# Patient Record
Sex: MALE | Race: OTHER | NOT HISPANIC OR LATINO | Employment: OTHER | URBAN - METROPOLITAN AREA
[De-identification: names, ages, dates, MRNs, and addresses within clinical notes are randomized per-mention and may not be internally consistent; named-entity substitution may affect disease eponyms.]

---

## 2021-07-02 ENCOUNTER — HOSPITAL ENCOUNTER (INPATIENT)
Facility: HOSPITAL | Age: 81
LOS: 1 days | Discharge: HOME | DRG: 198 | End: 2021-07-04
Attending: EMERGENCY MEDICINE | Admitting: FAMILY MEDICINE
Payer: MEDICAID

## 2021-07-02 ENCOUNTER — APPOINTMENT (EMERGENCY)
Dept: RADIOLOGY | Facility: HOSPITAL | Age: 81
DRG: 198 | End: 2021-07-02
Payer: MEDICAID

## 2021-07-02 ENCOUNTER — APPOINTMENT (OUTPATIENT)
Dept: NON INVASIVE DIAGNOSTICS | Facility: HOSPITAL | Age: 81
DRG: 198 | End: 2021-07-02
Payer: MEDICAID

## 2021-07-02 DIAGNOSIS — R55 NEAR SYNCOPE: ICD-10-CM

## 2021-07-02 DIAGNOSIS — R06.02 SHORTNESS OF BREATH: ICD-10-CM

## 2021-07-02 DIAGNOSIS — R07.9 CHEST PAIN: Primary | ICD-10-CM

## 2021-07-02 DIAGNOSIS — I10 HYPERTENSION: ICD-10-CM

## 2021-07-02 PROBLEM — N40.0 BENIGN PROSTATIC HYPERPLASIA WITHOUT LOWER URINARY TRACT SYMPTOMS: Status: ACTIVE | Noted: 2017-06-28

## 2021-07-02 PROBLEM — E11.9 TYPE 2 DIABETES MELLITUS WITHOUT COMPLICATION (HCC): Status: ACTIVE | Noted: 2017-06-28

## 2021-07-02 PROBLEM — E78.2 MIXED HYPERLIPIDEMIA: Status: ACTIVE | Noted: 2018-07-03

## 2021-07-02 PROBLEM — Z95.5 S/P CORONARY ARTERY STENT PLACEMENT: Status: ACTIVE | Noted: 2017-06-28

## 2021-07-02 LAB
ALBUMIN SERPL BCP-MCNC: 3.4 G/DL (ref 3.5–5)
ALP SERPL-CCNC: 67 U/L (ref 46–116)
ALT SERPL W P-5'-P-CCNC: 22 U/L (ref 12–78)
ANION GAP SERPL CALCULATED.3IONS-SCNC: 7 MMOL/L (ref 4–13)
AST SERPL W P-5'-P-CCNC: 20 U/L (ref 5–45)
ATRIAL RATE: 60 BPM
BASOPHILS # BLD AUTO: 0.05 THOUSANDS/ΜL (ref 0–0.1)
BASOPHILS NFR BLD AUTO: 1 % (ref 0–1)
BILIRUB SERPL-MCNC: 0.4 MG/DL (ref 0.2–1)
BUN SERPL-MCNC: 13 MG/DL (ref 5–25)
CALCIUM ALBUM COR SERPL-MCNC: 9.2 MG/DL (ref 8.3–10.1)
CALCIUM SERPL-MCNC: 8.7 MG/DL (ref 8.3–10.1)
CHLORIDE SERPL-SCNC: 105 MMOL/L (ref 100–108)
CO2 SERPL-SCNC: 28 MMOL/L (ref 21–32)
CREAT SERPL-MCNC: 0.8 MG/DL (ref 0.6–1.3)
EOSINOPHIL # BLD AUTO: 0.12 THOUSAND/ΜL (ref 0–0.61)
EOSINOPHIL NFR BLD AUTO: 2 % (ref 0–6)
ERYTHROCYTE [DISTWIDTH] IN BLOOD BY AUTOMATED COUNT: 12.4 % (ref 11.6–15.1)
GFR SERPL CREATININE-BSD FRML MDRD: 84 ML/MIN/1.73SQ M
GLUCOSE SERPL-MCNC: 128 MG/DL (ref 65–140)
HCT VFR BLD AUTO: 42 % (ref 36.5–49.3)
HGB BLD-MCNC: 14.2 G/DL (ref 12–17)
IMM GRANULOCYTES # BLD AUTO: 0.03 THOUSAND/UL (ref 0–0.2)
IMM GRANULOCYTES NFR BLD AUTO: 0 % (ref 0–2)
LIPASE SERPL-CCNC: 199 U/L (ref 73–393)
LYMPHOCYTES # BLD AUTO: 1.31 THOUSANDS/ΜL (ref 0.6–4.47)
LYMPHOCYTES NFR BLD AUTO: 18 % (ref 14–44)
MCH RBC QN AUTO: 31.5 PG (ref 26.8–34.3)
MCHC RBC AUTO-ENTMCNC: 33.8 G/DL (ref 31.4–37.4)
MCV RBC AUTO: 93 FL (ref 82–98)
MONOCYTES # BLD AUTO: 0.5 THOUSAND/ΜL (ref 0.17–1.22)
MONOCYTES NFR BLD AUTO: 7 % (ref 4–12)
NEUTROPHILS # BLD AUTO: 5.32 THOUSANDS/ΜL (ref 1.85–7.62)
NEUTS SEG NFR BLD AUTO: 72 % (ref 43–75)
NRBC BLD AUTO-RTO: 0 /100 WBCS
P AXIS: 65 DEGREES
PLATELET # BLD AUTO: 167 THOUSANDS/UL (ref 149–390)
PMV BLD AUTO: 10.9 FL (ref 8.9–12.7)
POTASSIUM SERPL-SCNC: 4.2 MMOL/L (ref 3.5–5.3)
PR INTERVAL: 164 MS
PROT SERPL-MCNC: 6.5 G/DL (ref 6.4–8.2)
QRS AXIS: 0 DEGREES
QRSD INTERVAL: 104 MS
QT INTERVAL: 400 MS
QTC INTERVAL: 400 MS
RBC # BLD AUTO: 4.51 MILLION/UL (ref 3.88–5.62)
SODIUM SERPL-SCNC: 140 MMOL/L (ref 136–145)
T WAVE AXIS: 61 DEGREES
TROPONIN I SERPL-MCNC: <0.02 NG/ML
VENTRICULAR RATE: 60 BPM
WBC # BLD AUTO: 7.33 THOUSAND/UL (ref 4.31–10.16)

## 2021-07-02 PROCEDURE — 80053 COMPREHEN METABOLIC PANEL: CPT | Performed by: EMERGENCY MEDICINE

## 2021-07-02 PROCEDURE — 99285 EMERGENCY DEPT VISIT HI MDM: CPT

## 2021-07-02 PROCEDURE — 93306 TTE W/DOPPLER COMPLETE: CPT

## 2021-07-02 PROCEDURE — 99220 PR INITIAL OBSERVATION CARE/DAY 70 MINUTES: CPT | Performed by: INTERNAL MEDICINE

## 2021-07-02 PROCEDURE — 36415 COLL VENOUS BLD VENIPUNCTURE: CPT | Performed by: EMERGENCY MEDICINE

## 2021-07-02 PROCEDURE — 84484 ASSAY OF TROPONIN QUANT: CPT | Performed by: INTERNAL MEDICINE

## 2021-07-02 PROCEDURE — 85025 COMPLETE CBC W/AUTO DIFF WBC: CPT | Performed by: EMERGENCY MEDICINE

## 2021-07-02 PROCEDURE — 71045 X-RAY EXAM CHEST 1 VIEW: CPT

## 2021-07-02 PROCEDURE — 99285 EMERGENCY DEPT VISIT HI MDM: CPT | Performed by: EMERGENCY MEDICINE

## 2021-07-02 PROCEDURE — 93010 ELECTROCARDIOGRAM REPORT: CPT | Performed by: INTERNAL MEDICINE

## 2021-07-02 PROCEDURE — 83690 ASSAY OF LIPASE: CPT | Performed by: EMERGENCY MEDICINE

## 2021-07-02 PROCEDURE — 93306 TTE W/DOPPLER COMPLETE: CPT | Performed by: INTERNAL MEDICINE

## 2021-07-02 PROCEDURE — 84484 ASSAY OF TROPONIN QUANT: CPT | Performed by: EMERGENCY MEDICINE

## 2021-07-02 PROCEDURE — 93005 ELECTROCARDIOGRAM TRACING: CPT

## 2021-07-02 RX ORDER — LOSARTAN POTASSIUM 25 MG/1
25 TABLET ORAL DAILY
COMMUNITY

## 2021-07-02 RX ORDER — ASPIRIN 81 MG/1
81 TABLET, CHEWABLE ORAL DAILY
COMMUNITY

## 2021-07-02 RX ORDER — NITROGLYCERIN 0.4 MG/1
0.4 TABLET SUBLINGUAL
Status: DISCONTINUED | OUTPATIENT
Start: 2021-07-02 | End: 2021-07-04 | Stop reason: HOSPADM

## 2021-07-02 RX ORDER — MAGNESIUM 30 MG
30 TABLET ORAL 2 TIMES DAILY
COMMUNITY

## 2021-07-02 RX ORDER — ACETAMINOPHEN 325 MG/1
650 TABLET ORAL EVERY 6 HOURS PRN
Status: DISCONTINUED | OUTPATIENT
Start: 2021-07-02 | End: 2021-07-04 | Stop reason: HOSPADM

## 2021-07-02 RX ORDER — HYDRALAZINE HYDROCHLORIDE 20 MG/ML
5 INJECTION INTRAMUSCULAR; INTRAVENOUS EVERY 6 HOURS PRN
Status: DISCONTINUED | OUTPATIENT
Start: 2021-07-02 | End: 2021-07-04 | Stop reason: HOSPADM

## 2021-07-02 RX ORDER — HEPARIN SODIUM 5000 [USP'U]/ML
5000 INJECTION, SOLUTION INTRAVENOUS; SUBCUTANEOUS EVERY 8 HOURS SCHEDULED
Status: DISCONTINUED | OUTPATIENT
Start: 2021-07-02 | End: 2021-07-04 | Stop reason: HOSPADM

## 2021-07-02 RX ORDER — ONDANSETRON 2 MG/ML
4 INJECTION INTRAMUSCULAR; INTRAVENOUS EVERY 6 HOURS PRN
Status: DISCONTINUED | OUTPATIENT
Start: 2021-07-02 | End: 2021-07-04 | Stop reason: HOSPADM

## 2021-07-02 RX ORDER — ASPIRIN 81 MG/1
243 TABLET, CHEWABLE ORAL ONCE
Status: COMPLETED | OUTPATIENT
Start: 2021-07-02 | End: 2021-07-02

## 2021-07-02 RX ORDER — VIT B COMP NO.3/FOLIC/C/BIOTIN 1 MG-60 MG
1 TABLET ORAL
COMMUNITY

## 2021-07-02 RX ORDER — ATORVASTATIN CALCIUM 20 MG/1
20 TABLET, FILM COATED ORAL DAILY
COMMUNITY

## 2021-07-02 RX ADMIN — HEPARIN SODIUM 5000 UNITS: 5000 INJECTION INTRAVENOUS; SUBCUTANEOUS at 23:54

## 2021-07-02 RX ADMIN — ASPIRIN 81 MG CHEWABLE TABLET 243 MG: 81 TABLET CHEWABLE at 11:47

## 2021-07-02 RX ADMIN — HEPARIN SODIUM 5000 UNITS: 5000 INJECTION INTRAVENOUS; SUBCUTANEOUS at 16:05

## 2021-07-02 NOTE — H&P
Rashad Moulton 1940, [de-identified] y o  male MRN: [id number]  Unit/Bed#: ED 09 Encounter: [id number]  Primary Care Provider: No primary care provider on file  Date and time admitted to hospital: 7/2/2021 11:28 AM    Chest pain  Assessment & Plan  Presented with exertional chest pain  Has had intermittent episodes recently  Heart score 7  EKG showed no signs of acute ischemia  Initial troponin negative  Will trend troponins  Given typical symptoms will consult Cardiology for possible stress test versus procedure    Mixed hyperlipidemia  Assessment & Plan  Continue statin therapy    Type 2 diabetes mellitus without complication ([facility] [address] )  Assessment & Plan  No results found for: HGBA1C    No results for input(s): POCGLU in the last 72 hours  Blood Sugar Average: Last 72 hrs:   sliding scale insulin    S/P coronary artery stent placement  Assessment & Plan  Continue aspirin, statin, beta-blocker    Essential hypertension  Assessment & Plan  Continue home meds      VTE Prophylaxis: Heparin  / sequential compression device   Code Status: full code  POLST: There is no POLST form on file for this patient (pre-hospital)  Discussion with family: pt    Anticipated Length of Stay:  Patient will be admitted on an Observation basis with an anticipated length of stay of  < 2 midnights  Justification for Hospital Stay:  Chest pain    Total Time for Visit, including Counseling / Coordination of Care: 60 minutes  Greater than 50% of this total time spent on direct patient counseling and coordination of care  Chief Complaint:   Chest pain    History of Present Illness:    Reyes Schmidt is a [de-identified] y o  male past medical history significant type 2 diabetes, coronary artery disease status post PCI, hyperlipidemia, hypertension initially presented chest pain  Reports exertional chest pain worse with walking earlier today  Also notes shortness of breath    Notes intermittent symptoms the past few weeks with worsening acutely earlier today  Otherwise denies any acute complaints    Review of Systems:    Review of Systems   Constitutional: Negative for appetite change, chills, diaphoresis, fatigue, fever and unexpected weight change  HENT: Negative for congestion, rhinorrhea and sore throat  Eyes: Negative for photophobia and visual disturbance  Respiratory: Positive for shortness of breath  Negative for cough and wheezing  Cardiovascular: Positive for chest pain  Negative for palpitations and leg swelling  Gastrointestinal: Negative for abdominal pain, anal bleeding, blood in stool, constipation, diarrhea, nausea and vomiting  Genitourinary: Negative for decreased urine volume, difficulty urinating, dysuria, flank pain, frequency, hematuria and urgency  Musculoskeletal: Negative for arthralgias, back pain, joint swelling and myalgias  Skin: Negative for color change and rash  Neurological: Negative for dizziness, seizures, facial asymmetry, speech difficulty, numbness and headaches  Psychiatric/Behavioral: Negative for agitation, confusion and decreased concentration  The patient is not nervous/anxious  Past Medical and Surgical History:     No past medical history on file  No past surgical history on file  Meds/Allergies:    Prior to Admission medications    Not on File     I have reviewed home medications with patient personally  Allergies: No Known Allergies    Social History:     Marital Status: Single     Patient Pre-hospital Living Situation: home  Patient Pre-hospital Level of Mobility: indepdent  Patient Pre-hospital Diet Restrictions: none  Substance Use History:   Social History     Substance and Sexual Activity   Alcohol Use Not on file     Social History     Tobacco Use   Smoking Status Not on file     Social History     Substance and Sexual Activity   Drug Use Not on file       Family History:    No family history on file      Physical Exam: Vitals:   Blood Pressure: (!) 176/84 (07/02/21 1130)  Pulse: 58 (07/02/21 1130)  Temperature: 97 8 °F (36 6 °C) (07/02/21 1130)  Temp Source: Oral (07/02/21 1130)  Respirations: 15 (07/02/21 1130)  Weight - Scale: 70 kg (154 lb 5 2 oz) (07/02/21 1130)  SpO2: 97 % (07/02/21 1200)    Physical Exam  Constitutional:       General: He is not in acute distress  Appearance: He is well-developed  He is not diaphoretic  HENT:      Head: Normocephalic and atraumatic  Nose: Nose normal       Mouth/Throat:      Pharynx: No oropharyngeal exudate  Eyes:      General: No scleral icterus  Conjunctiva/sclera: Conjunctivae normal    Cardiovascular:      Rate and Rhythm: Normal rate and regular rhythm  Heart sounds: Normal heart sounds  No murmur heard  No friction rub  No gallop  Pulmonary:      Effort: Pulmonary effort is normal  No respiratory distress  Breath sounds: Normal breath sounds  No wheezing or rales  Chest:      Chest wall: No tenderness  Abdominal:      General: Bowel sounds are normal  There is no distension  Palpations: Abdomen is soft  Tenderness: There is no abdominal tenderness  There is no guarding  Musculoskeletal:         General: No tenderness or deformity  Normal range of motion  Cervical back: Normal range of motion and neck supple  Skin:     General: Skin is warm and dry  Findings: No erythema  Neurological:      Mental Status: He is alert  Mental status is at baseline  Additional Data:     Lab Results: I have personally reviewed pertinent reports        Results from last 7 days   Lab Units 07/02/21  1155   WBC Thousand/uL 7 33   HEMOGLOBIN g/dL 14 2   HEMATOCRIT % 42 0   PLATELETS Thousands/uL 167   NEUTROS PCT % 72   LYMPHS PCT % 18   MONOS PCT % 7   EOS PCT % 2     Results from last 7 days   Lab Units 07/02/21  1155   SODIUM mmol/L 140   POTASSIUM mmol/L 4 2   CHLORIDE mmol/L 105   CO2 mmol/L 28   BUN mg/dL 13   CREATININE mg/dL 0  80   ANION GAP mmol/L 7   CALCIUM mg/dL 8 7   ALBUMIN g/dL 3 4*   TOTAL BILIRUBIN mg/dL 0 40   ALK PHOS U/L 67   ALT U/L 22   AST U/L 20   GLUCOSE RANDOM mg/dL 128                       Imaging: I have personally reviewed pertinent reports  XR chest 1 view portable   ED Interpretation by Len Hsu MD (07/02 0277)   No acute cardiopulmonary process or osseous abnormality  Final Result by Rex Negro MD (07/02 2741)      No acute cardiopulmonary disease  Workstation performed: [id number]             EKG, Pathology, and Other Studies Reviewed on Admission:   · EKG: reviewed    Allscripts / Epic Records Reviewed: Yes     ** Please Note: This note has been constructed using a voice recognition system   **

## 2021-07-02 NOTE — PLAN OF CARE
Problem: Potential for Falls  Goal: Patient will remain free of falls  Description: INTERVENTIONS:  - Educate patient/family on patient safety including physical limitations  - Instruct patient to call for assistance with activity   - Consult OT/PT to assist with strengthening/mobility   - Keep Call bell within reach  - Keep bed low and locked with side rails adjusted as appropriate  - Keep care items and personal belongings within reach  - Initiate and maintain comfort rounds  - Make Fall Risk Sign visible to staff  - Offer Toileting every 2 Hours, in advance of need  - Initiate/Maintain per shift alarm  - Obtain necessary fall risk management equipment: as needed it  - Apply yellow socks and bracelet for high fall risk patients  - Consider moving patient to room near nurses station  Outcome: Progressing

## 2021-07-02 NOTE — ASSESSMENT & PLAN NOTE
No results found for: HGBA1C    No results for input(s): POCGLU in the last 72 hours      Blood Sugar Average: Last 72 hrs:   sliding scale insulin

## 2021-07-02 NOTE — ASSESSMENT & PLAN NOTE
Presented with exertional chest pain  Has had intermittent episodes recently  Heart score 7  EKG showed no signs of acute ischemia  Initial troponin negative  Will trend troponins  Given typical symptoms will consult Cardiology for possible stress test versus procedure

## 2021-07-02 NOTE — ED PROVIDER NOTES
Pt Name: Asad Altamirano  MRN: [id number]  [place] 1940  Age/Sex: [de-identified] y o  male  Date of evaluation: 7/2/2021  PCP: No primary care provider on file  CHIEF COMPLAINT    Chief Complaint   Patient presents with    Shortness of Breath     Patient co SOB and dizziness after taking his morning walk this morning  HPI    [de-identified] y o  male presenting with chest pressure, shortness of breath, lightheadedness  Patient states that he was walking and developed shortness of breath and lightheadedness as well as a pressure in his chest during his morning walk today  Patient finished his walk and sat down and rested with resolution of symptoms  He states that over the past several weeks he has had similar episodes but none as severe as this  Of note, patient had a prior heart attack and stents  He denies fever, nausea, vomiting, diarrhea, trauma, other symptoms  HPI      Past Medical and Surgical History    No past medical history on file  No past surgical history on file  No family history on file  Social History     Tobacco Use    Smoking status: Not on file   Substance Use Topics    Alcohol use: Not on file    Drug use: Not on file           Allergies    No Known Allergies    Home Medications    Prior to Admission medications    Not on File           Review of Systems    Review of Systems   Constitutional: Negative for appetite change, chills and diaphoresis  HENT: Negative for drooling, facial swelling, trouble swallowing and voice change  Respiratory: Positive for shortness of breath  Negative for apnea and wheezing  Cardiovascular: Positive for chest pain  Negative for leg swelling  Gastrointestinal: Negative for abdominal distention, abdominal pain, diarrhea, nausea and vomiting  Genitourinary: Negative for dysuria and urgency  Musculoskeletal: Negative for arthralgias, back pain, gait problem and neck pain  Skin: Negative for color change, rash and wound  Neurological: Positive for light-headedness  Negative for seizures, speech difficulty, weakness and headaches  Psychiatric/Behavioral: Negative for agitation, behavioral problems and dysphoric mood  The patient is not nervous/anxious  All other systems reviewed and negative  Physical Exam      ED Triage Vitals   Temperature Pulse Respirations Blood Pressure SpO2   07/02/21 1130 07/02/21 1130 07/02/21 1130 07/02/21 1130 07/02/21 1130   97 8 °F (36 6 °C) 58 15 (!) 176/84 97 %      Temp Source Heart Rate Source Patient Position - Orthostatic VS BP Location FiO2 (%)   07/02/21 1130 07/02/21 1130 07/02/21 1130 07/02/21 1130 --   Oral Monitor Sitting Right arm       Pain Score       07/02/21 1156       6               Physical Exam  Vitals and nursing note reviewed  Constitutional:       Appearance: He is well-developed  HENT:      Head: Normocephalic and atraumatic  Right Ear: External ear normal       Left Ear: External ear normal    Eyes:      Conjunctiva/sclera: Conjunctivae normal       Pupils: Pupils are equal, round, and reactive to light  Neck:      Trachea: No tracheal deviation  Cardiovascular:      Rate and Rhythm: Normal rate and regular rhythm  Heart sounds: Normal heart sounds  No murmur heard  Pulmonary:      Effort: Pulmonary effort is normal  No respiratory distress  Breath sounds: Normal breath sounds  No stridor  No wheezing or rales  Abdominal:      General: There is no distension  Palpations: Abdomen is soft  Tenderness: There is no abdominal tenderness  There is no guarding or rebound  Musculoskeletal:         General: No deformity  Normal range of motion  Cervical back: Normal range of motion and neck supple  Skin:     General: Skin is warm and dry  Findings: No rash  Neurological:      Mental Status: He is alert and oriented to person, place, and time     Psychiatric:         Behavior: Behavior normal          Thought Content: Thought content normal          Judgment: Judgment normal               Diagnostic Results    EKG Interpretation    Rate:  60  BPM  Rhythm:  Normal Sinus Rhythm   Axis:  Normal   Intervals: Normal, no blocks, QTc  400 ms  Q waves:  Q-wave in V2  T waves: Inverted in AVR and aVL  ST segments:  No significant elevations or depressions     Impression:  Normal sinus rhythm with nonspecific changes but without evidence of acute ischemia or significant arrhythmia      EKG for comparison:  None available    EKG interpreted by me       Labs:    Results Reviewed     Procedure Component Value Units Date/Time    Troponin I [[id number]]  (Normal) Collected: 07/02/21 1155    Lab Status: Final result Specimen: Blood from Arm, Right Updated: 07/02/21 1226     Troponin I <0 02 ng/mL     Comprehensive metabolic panel [[id number]]  (Abnormal) Collected: 07/02/21 1155    Lab Status: Final result Specimen: Blood from Arm, Right Updated: 07/02/21 1224     Sodium 140 mmol/L      Potassium 4 2 mmol/L      Chloride 105 mmol/L      CO2 28 mmol/L      ANION GAP 7 mmol/L      BUN 13 mg/dL      Creatinine 0 80 mg/dL      Glucose 128 mg/dL      Calcium 8 7 mg/dL      Corrected Calcium 9 2 mg/dL      AST 20 U/L      ALT 22 U/L      Alkaline Phosphatase 67 U/L      Total Protein 6 5 g/dL      Albumin 3 4 g/dL      Total Bilirubin 0 40 mg/dL      eGFR 84 ml/min/1 73sq m     Narrative:      Mariia guidelines for Chronic Kidney Disease (CKD):     Stage 1 with normal or high GFR (GFR > 90 mL/min/1 73 square meters)    Stage 2 Mild CKD (GFR = 60-89 mL/min/1 73 square meters)    Stage 3A Moderate CKD (GFR = 45-59 mL/min/1 73 square meters)    Stage 3B Moderate CKD (GFR = 30-44 mL/min/1 73 square meters)    Stage 4 Severe CKD (GFR = 15-29 mL/min/1 73 square meters)    Stage 5 End Stage CKD (GFR <15 mL/min/1 73 square meters)  Note: GFR calculation is accurate only with a steady state creatinine    Lipase [[id number]] (Normal) Collected: 07/02/21 1155    Lab Status: Final result Specimen: Blood from Arm, Right Updated: 07/02/21 1224     Lipase 199 u/L     CBC and differential [[id number]] Collected: 07/02/21 1155    Lab Status: Final result Specimen: Blood from Arm, Right Updated: 07/02/21 1213     WBC 7 33 Thousand/uL      RBC 4 51 Million/uL      Hemoglobin 14 2 g/dL      Hematocrit 42 0 %      MCV 93 fL      MCH 31 5 pg      MCHC 33 8 g/dL      RDW 12 4 %      MPV 10 9 fL      Platelets 699 Thousands/uL      nRBC 0 /100 WBCs      Neutrophils Relative 72 %      Immat GRANS % 0 %      Lymphocytes Relative 18 %      Monocytes Relative 7 %      Eosinophils Relative 2 %      Basophils Relative 1 %      Neutrophils Absolute 5 32 Thousands/µL      Immature Grans Absolute 0 03 Thousand/uL      Lymphocytes Absolute 1 31 Thousands/µL      Monocytes Absolute 0 50 Thousand/µL      Eosinophils Absolute 0 12 Thousand/µL      Basophils Absolute 0 05 Thousands/µL     Troponin I repeat in 3hrs [[id number]]     Lab Status: No result Specimen: Blood           All labs reviewed and utilized in the medical decision making process    Radiology:    XR chest 1 view portable   ED Interpretation   No acute cardiopulmonary process or osseous abnormality  Final Result      No acute cardiopulmonary disease                 Workstation performed: [id number]             All radiology studies independently viewed by me and interpreted by the radiologist     Procedure    Procedures        ED Course of Care and Re-Assessments      Given aspirin in emergency department    Medications   hydrALAZINE (APRESOLINE) injection 5 mg (has no administration in time range)   heparin (porcine) subcutaneous injection 5,000 Units (has no administration in time range)   acetaminophen (TYLENOL) tablet 650 mg (has no administration in time range)   ondansetron (ZOFRAN) injection 4 mg (has no administration in time range)   nitroglycerin (NITROSTAT) SL tablet 0 4 mg (has no administration in time range)   aspirin chewable tablet 243 mg (243 mg Oral Given 7/2/21 1147)           FINAL IMPRESSION    Final diagnoses:   Chest pain   Shortness of breath   Hypertension   Near syncope         DISPOSITION/PLAN    Exertional chest pain, shortness of breath, near-syncope in the setting of hypertension as above  Vital signs remarkable for hypertension , examination nonspecific  EKG likewise nonspecific, based on significant cardiac history of prior stents as well as exertional chest pain and lightheadedness worsening over the past several weeks, admitted for cardiac observation concern for ACS versus critical stenosis  Low suspicion for PE, aortic dissection, bacterial pneumonia, sepsis, other alternate etiology at this time  Hemodynamically stable and comfortable at time of admit  Time reflects when diagnosis was documented in both MDM as applicable and the Disposition within this note     Time User Action Codes Description Comment    7/2/2021  1:13 PM Iftikhar Quant T Add [R07 9] Chest pain     7/2/2021  1:13 PM Iftikhar Quant T Add [R06 02] Shortness of breath     7/2/2021  1:13 PM Areta Hawking Add [I10] Hypertension     7/2/2021  1:52 PM Areta Hawking Add [R55] Near syncope       ED Disposition     ED Disposition Condition Date/Time Comment    Admit Stable Fri Jul 2, 2021  1:12 PM Case was discussed with NISREEN and the patient's admission status was agreed to be Admission Status: observation status to the service of Dr Lisa French  Follow-up Information    None           PATIENT REFERRED TO:    No follow-up provider specified  DISCHARGE MEDICATIONS:    Patient's Medications    No medications on file       No discharge procedures on file           MD Ej Everett MD  07/02/21 1089

## 2021-07-03 LAB
ATRIAL RATE: 48 BPM
ATRIAL RATE: 49 BPM
ATRIAL RATE: 52 BPM
P AXIS: 48 DEGREES
P AXIS: 52 DEGREES
P AXIS: 54 DEGREES
P AXIS: 56 DEGREES
P AXIS: 57 DEGREES
PR INTERVAL: 162 MS
PR INTERVAL: 162 MS
PR INTERVAL: 164 MS
PR INTERVAL: 164 MS
PR INTERVAL: 172 MS
QRS AXIS: -14 DEGREES
QRS AXIS: -14 DEGREES
QRS AXIS: -15 DEGREES
QRS AXIS: -15 DEGREES
QRS AXIS: -19 DEGREES
QRSD INTERVAL: 90 MS
QRSD INTERVAL: 92 MS
QRSD INTERVAL: 94 MS
QRSD INTERVAL: 94 MS
QRSD INTERVAL: 96 MS
QT INTERVAL: 416 MS
QT INTERVAL: 424 MS
QT INTERVAL: 434 MS
QT INTERVAL: 436 MS
QT INTERVAL: 436 MS
QTC INTERVAL: 378 MS
QTC INTERVAL: 386 MS
QTC INTERVAL: 387 MS
QTC INTERVAL: 389 MS
QTC INTERVAL: 393 MS
T WAVE AXIS: 33 DEGREES
T WAVE AXIS: 35 DEGREES
T WAVE AXIS: 36 DEGREES
T WAVE AXIS: 36 DEGREES
T WAVE AXIS: 38 DEGREES
VENTRICULAR RATE: 48 BPM
VENTRICULAR RATE: 49 BPM
VENTRICULAR RATE: 52 BPM

## 2021-07-03 PROCEDURE — 99232 SBSQ HOSP IP/OBS MODERATE 35: CPT | Performed by: PHYSICIAN ASSISTANT

## 2021-07-03 PROCEDURE — 99254 IP/OBS CNSLTJ NEW/EST MOD 60: CPT | Performed by: INTERNAL MEDICINE

## 2021-07-03 PROCEDURE — 93010 ELECTROCARDIOGRAM REPORT: CPT | Performed by: INTERNAL MEDICINE

## 2021-07-03 RX ORDER — ASPIRIN 81 MG/1
81 TABLET, CHEWABLE ORAL DAILY
Status: DISCONTINUED | OUTPATIENT
Start: 2021-07-03 | End: 2021-07-04 | Stop reason: HOSPADM

## 2021-07-03 RX ORDER — LOSARTAN POTASSIUM 25 MG/1
25 TABLET ORAL DAILY
Status: DISCONTINUED | OUTPATIENT
Start: 2021-07-03 | End: 2021-07-04 | Stop reason: HOSPADM

## 2021-07-03 RX ORDER — ATORVASTATIN CALCIUM 20 MG/1
20 TABLET, FILM COATED ORAL DAILY
Status: DISCONTINUED | OUTPATIENT
Start: 2021-07-03 | End: 2021-07-04 | Stop reason: HOSPADM

## 2021-07-03 RX ADMIN — HEPARIN SODIUM 5000 UNITS: 5000 INJECTION INTRAVENOUS; SUBCUTANEOUS at 22:49

## 2021-07-03 RX ADMIN — LOSARTAN POTASSIUM 25 MG: 25 TABLET, FILM COATED ORAL at 13:20

## 2021-07-03 RX ADMIN — ASPIRIN 81 MG CHEWABLE TABLET 81 MG: 81 TABLET CHEWABLE at 13:20

## 2021-07-03 RX ADMIN — HEPARIN SODIUM 5000 UNITS: 5000 INJECTION INTRAVENOUS; SUBCUTANEOUS at 13:20

## 2021-07-03 RX ADMIN — ATORVASTATIN CALCIUM 20 MG: 20 TABLET, FILM COATED ORAL at 13:20

## 2021-07-03 RX ADMIN — HEPARIN SODIUM 5000 UNITS: 5000 INJECTION INTRAVENOUS; SUBCUTANEOUS at 06:05

## 2021-07-03 NOTE — CONSULTS
Consultation - Cardiology   Darian Goldstein [de-identified] y o  male MRN: [id number]  Unit/Bed#: -01 Encounter: [id number]  07/03/21  1:40 PM    Assessment/ Plan:  1-chest pain, troponins negative x3 in patient with known CAD and stents about 10 years ago  Plan for stress echocardiogram to be done tomorrow 7/4/2021  Further recommendations based upon stress echo findings  Echocardiogram was done EF 66%, grade 1 diastolic dysfunction, trace MR/mild TR/trace SC  Remain off of Toprol  Continue aspirin, Lipitor, Cozaar    2-CAD with history of stents about 10 years ago  Patient is from [place] where he currently receives care most of the time  Continue with aspirin, Lipitor, Cozaar  Echocardiogram as above  Stress echocardiogram ordered and pending  3-hypertension, stable  Last blood pressure 130/67, continue with Cozaar    4-hyperlipidemia on Lipitor    5-sinus bradycardia, heart rate in the 50s  Patient previously on Toprol  Remain off the Toprol at this time  Continue telemetry  6-diabetes, management per [facility] Hospitalist      History of Present Illness   Physician Requesting Consult: Abdullahi Borrego MD    Reason for Consult / Principal Problem: cp    HPI: [place] is a [de-identified]y o  year old male who presents with chest pain that began when patient was walking  Patient states the pain is worse with exertion  Patient also noted he was feeling short of breath  Patient states he has been having these symptoms off and on for the last couple of weeks but it became acutely worse today  Patient is usually very active and walks about 1 hour a day  Patient lives in [place] and is currently visiting the [place] [address]  Patient speaks broken [place] and the son helps with translation  Currently patient states he has no complaints  Patient was previously taking Toprol at home, and at last visit family doctor advised to talk to Cardiology about stopping it      Past medical history includes CAD with stents about 10 years ago, rheumatoid arthritis, diabetes, hyperlipidemia, hypertension    Consults    EKG:  Sinus bradycardia      Review of Systems   Constitutional: Negative  Respiratory: Positive for shortness of breath  Cardiovascular: Positive for chest pain  Neurological: Negative  Hematological: Negative  Psychiatric/Behavioral: Negative  All other systems reviewed and are negative  Historical Information   History reviewed  No pertinent past medical history  History reviewed  No pertinent surgical history  Social History     Substance and Sexual Activity   Alcohol Use Never     Social History     Substance and Sexual Activity   Drug Use Never     Social History     Tobacco Use   Smoking Status Never Smoker   Smokeless Tobacco Never Used       Family History:   Family History   Problem Relation Age of Onset    Dementia Mother        Meds/Allergies   all current active meds have been reviewed and current meds:   Current Facility-Administered Medications   Medication Dose Route Frequency    acetaminophen (TYLENOL) tablet 650 mg  650 mg Oral Q6H PRN    aspirin chewable tablet 81 mg  81 mg Oral Daily    atorvastatin (LIPITOR) tablet 20 mg  20 mg Oral Daily    heparin (porcine) subcutaneous injection 5,000 Units  5,000 Units Subcutaneous Q8H [address]    hydrALAZINE (APRESOLINE) injection 5 mg  5 mg Intravenous Q6H PRN    losartan (COZAAR) tablet 25 mg  25 mg Oral Daily    [START ON 7/4/2021] multivitamin stress formula tablet 1 tablet  1 tablet Oral Daily With Breakfast    nitroglycerin (NITROSTAT) SL tablet 0 4 mg  0 4 mg Sublingual Q5 Min PRN    ondansetron (ZOFRAN) injection 4 mg  4 mg Intravenous Q6H PRN     No Known Allergies    Objective   Vitals: Blood pressure 131/70, pulse 60, temperature 97 5 °F (36 4 °C), resp  rate 18, height 5' 10 47" (1 79 m), weight 70 kg (154 lb 5 2 oz), SpO2 96 %  , Body mass index is 21 85 kg/m² ,   Orthostatic Blood Pressures      Most Recent Value   Blood Pressure  131/70 filed at 07/03/2021 1322   Patient Position - Orthostatic VS  Sitting filed at 07/02/2021 8272          Systolic (96GEJ), SAC:463 , Min:118 , LMQ:909     Diastolic (12WBZ), GHL:19, Min:65, Max:80        Intake/Output Summary (Last 24 hours) at 7/3/2021 1340  Last data filed at 7/3/2021 1100  Gross per 24 hour   Intake 810 ml   Output --   Net 810 ml       Invasive Devices     Peripheral Intravenous Line            Peripheral IV 07/02/21 Right Antecubital 1 day                    Physical Exam:  GEN: Alert and oriented x 3, in no acute distress  Well appearing and well nourished  +Minimal english speaking  HEENT: Sclera anicteric, conjunctivae pink, mucous membranes moist  Oropharynx clear  NECK: Supple, no carotid bruits, no significant JVD  Trachea midline, no thyromegaly  HEART: Regular rhythm, normal S1 and S2, no murmurs, clicks, gallops or rubs  PMI nondisplaced, no thrills  LUNGS: Clear to auscultation bilaterally; no wheezes, rales, or rhonchi  No increased work of breathing or signs of respiratory distress  ABDOMEN: Soft, nontender, nondistended, normoactive bowel sounds  EXTREMITIES: Skin warm and well perfused, no clubbing, cyanosis, or edema  NEURO: No focal findings  Normal speech  Mood and affect normal    SKIN: Normal without suspicious lesions on exposed skin        Lab Results:     Troponins:   Results from last 7 days   Lab Units 07/02/21  1918 07/02/21  1513 07/02/21  1155   TROPONIN I ng/mL <0 02 <0 02 <0 02       CBC with diff:   Results from last 7 days   Lab Units 07/02/21  1155   WBC Thousand/uL 7 33   HEMOGLOBIN g/dL 14 2   HEMATOCRIT % 42 0   MCV fL 93   PLATELETS Thousands/uL 167   MCH pg 31 5   MCHC g/dL 33 8   RDW % 12 4   MPV fL 10 9   NRBC AUTO /100 WBCs 0         CMP:   Results from last 7 days   Lab Units 07/02/21  1155   POTASSIUM mmol/L 4 2   CHLORIDE mmol/L 105   CO2 mmol/L 28   BUN mg/dL 13   CREATININE mg/dL 0  80   CALCIUM mg/dL 8 7   AST U/L 20   ALT U/L 22   ALK PHOS U/L 67   EGFR ml/min/1 73sq m 84

## 2021-07-03 NOTE — PROGRESS NOTES
[facility]  Progress Note - [place] 1940, [de-identified] y o  male MRN: [id number]  Unit/Bed#: -01 Encounter: [id number]  Primary Care Provider: No primary care provider on file  Date and time admitted to hospital: 7/2/2021 11:28 AM    * Chest pain  Assessment & Plan  Presented with exertional chest pain  Has had intermittent episodes recently  Heart score 7  EKG showed no signs of acute ischemia  Troponin negative x 3  Given typical symptoms Cardiology consulted- stress test ordered for tomorrow 7/4/2021  Mixed hyperlipidemia  Assessment & Plan  Continue statin therapy    Type 2 diabetes mellitus without complication (HCC)  Assessment & Plan  No results found for: HGBA1C    No results for input(s): POCGLU in the last 72 hours  Blood Sugar Average: Last 72 hrs:   sliding scale insulin    S/P coronary artery stent placement  Assessment & Plan  Continue aspirin, statin  Essential hypertension  Assessment & Plan  Continue losartan         VTE Pharmacologic Prophylaxis:   Moderate Risk (Score 3-4) - Pharmacological DVT Prophylaxis Ordered: heparin  Patient Centered Rounds: I performed bedside rounds with nursing staff today  Discussions with Specialists or Other Care Team Provider: cardiology    Education and Discussions with Family / Patient: Updated  (son) at bedside  Time Spent for Care: 20 minutes  More than 50% of total time spent on counseling and coordination of care as described above  Current Length of Stay: 0 day(s)  Current Patient Status: Inpatient   Certification Statement: The patient, admitted on an observation basis, will now require > 2 midnight hospital stay due to need for continued workup of chest pain with planned stress test tomorrow 7/4/2021  Discharge Plan: Anticipate discharge tomorrow to home  Code Status: Level 1 - Full Code    Subjective: The patient was seen and examined  The patient denies any complaints  Objective:     Vitals:   Temp (24hrs), Av 7 °F (36 5 °C), Min:97 1 °F (36 2 °C), Max:98 °F (36 7 °C)    Temp:  [97 1 °F (36 2 °C)-98 °F (36 7 °C)] 97 5 °F (36 4 °C)  HR:  [50-60] 60  Resp:  [16-18] 18  BP: (118-166)/(65-80) 131/70  SpO2:  [93 %-96 %] 96 %  Body mass index is 21 85 kg/m²  Input and Output Summary (last 24 hours): Intake/Output Summary (Last 24 hours) at 7/3/2021 1340  Last data filed at 7/3/2021 1100  Gross per 24 hour   Intake 810 ml   Output --   Net 810 ml       Physical Exam:   Physical Exam  Vitals and nursing note reviewed  Constitutional:       General: He is awake  Appearance: Normal appearance  Cardiovascular:      Rate and Rhythm: Normal rate and regular rhythm  Pulmonary:      Effort: Pulmonary effort is normal       Breath sounds: Normal breath sounds  No wheezing, rhonchi or rales  Abdominal:      General: Bowel sounds are normal       Palpations: Abdomen is soft  Tenderness: There is no abdominal tenderness  Skin:     General: Skin is warm and dry  Neurological:      General: No focal deficit present  Mental Status: He is alert and oriented to person, place, and time  Psychiatric:         Attention and Perception: Attention normal          Mood and Affect: Mood normal          Speech: Speech normal          Behavior: Behavior is cooperative            Additional Data:     Labs:  Results from last 7 days   Lab Units 21  1155   WBC Thousand/uL 7 33   HEMOGLOBIN g/dL 14 2   HEMATOCRIT % 42 0   PLATELETS Thousands/uL 167   NEUTROS PCT % 72   LYMPHS PCT % 18   MONOS PCT % 7   EOS PCT % 2     Results from last 7 days   Lab Units 21  1155   SODIUM mmol/L 140   POTASSIUM mmol/L 4 2   CHLORIDE mmol/L 105   CO2 mmol/L 28   BUN mg/dL 13   CREATININE mg/dL 0 80   ANION GAP mmol/L 7   CALCIUM mg/dL 8 7   ALBUMIN g/dL 3 4*   TOTAL BILIRUBIN mg/dL 0 40   ALK PHOS U/L 67   ALT U/L 22   AST U/L 20   GLUCOSE RANDOM mg/dL 128 Lines/Drains:  Invasive Devices     Peripheral Intravenous Line            Peripheral IV 07/02/21 Right Antecubital 1 day                  Telemetry:  Telemetry Orders (From admission, onward)             48 Hour Telemetry Monitoring  Continuous x 48 hours     Question:  Reason for 48 Hour Telemetry  Answer:  Acute MI, chest pain - R/O MI, or unstable angina                 Telemetry Reviewed: Normal Sinus Rhythm  Indication for Continued Telemetry Use: Acute MI/Unstable Angina/Rule out ACS           Imaging: No pertinent imaging reviewed  Recent Cultures (last 7 days):         Last 24 Hours Medication List:   Current Facility-Administered Medications   Medication Dose Route Frequency Provider Last Rate    acetaminophen  650 mg Oral Q6H PRN Gustavo Weaver MD      aspirin  81 mg Oral Daily Hillary Berman PA-C      atorvastatin  20 mg Oral Daily Hillary Berman PA-C      heparin (porcine)  5,000 Units Subcutaneous Q8H [address] Gustavo Weaver MD      hydrALAZINE  5 mg Intravenous Q6H PRN Gustavo Weaver MD      losartan  25 mg Oral Daily Hillary Berman PA-C      [START ON 7/4/2021] multivitamin stress formula  1 tablet Oral Daily With Breakfast Hillary Berman PA-C      nitroglycerin  0 4 mg Sublingual Q5 Min PRN Gustavo Weaver MD      ondansetron  4 mg Intravenous Q6H PRN Ekta Mcfadden MD          Today, Patient Was Seen By: Hillary Berman PA-C    **Please Note: This note may have been constructed using a voice recognition system  **

## 2021-07-03 NOTE — ASSESSMENT & PLAN NOTE
Presented with exertional chest pain  Has had intermittent episodes recently  Heart score 7  EKG showed no signs of acute ischemia  Troponin negative x 3  Given typical symptoms Cardiology consulted- stress test ordered for tomorrow 7/4/2021

## 2021-07-03 NOTE — UTILIZATION REVIEW
Initial Clinical Review    Admission: Date/Time/Statement:   Admission Orders (From admission, onward)     Ordered        07/02/21 1313  Place in Observation  Once                   07/03/21 1339  Inpatient Admission Once     Transfer Service: Hospitalist       Question Answer Comment   Level of Care Med Surg    Estimated length of stay More than 2 Midnights    Certification I certify that inpatient services are medically necessary for this patient for a duration of greater than two midnights  See H&P and MD Progress Notes for additional information about the patient's course of treatment  07/03/21 1339   OBSERVATION   7/2  @   [address]  7/3  @   1339  The patient, admitted on an observation basis, will now require > 2 midnight hospital stay due to need for continued workup of chest pain with planned stress test tomorrow 7/4/2021    ED Arrival Information     Expected Arrival Acuity    - 7/2/2021 11:22 Emergent         Means of arrival Escorted by Service Admission type    [facility] Member General Medicine Emergency         Arrival complaint    SOB        Chief Complaint   Patient presents with    Shortness of Breath     Patient co SOB and dizziness after taking his morning walk this morning  Initial Presentation:   [de-identified] Y O male  Presents to ED from home with exertional chest pain, worse with walking, since  Earlier in the day  Has  Shortness of  Breath, symptoms intermittent for past few weeks,  Worse the am of admission  EKG  Shows no acute ischemia  PMH  Is  Essential hypertension, DM2, S/P  Coronary artery stent  Admit  Observation with Chest pain and plan is  Monitor labs, trend troponin, cardiology consult and continue home meds  Date:      69  INPATIENT    Cardiology  Consult  Chest pain     Negative troponin X 3, known CAD/stents  Plan stress echo  7/4  Heart rate in the  50's,  Remain off toprol and continue tele       ED Triage Vitals   Temperature Pulse Respirations Blood Pressure SpO2   07/02/21 1130 07/02/21 1130 07/02/21 1130 07/02/21 1130 07/02/21 1130   97 8 °F (36 6 °C) 58 15 (!) 176/84 97 %      Temp Source Heart Rate Source Patient Position - Orthostatic VS BP Location FiO2 (%)   07/02/21 1130 07/02/21 1130 07/02/21 1130 07/02/21 1130 --   Oral Monitor Sitting Right arm       Pain Score       07/02/21 1156       6          Wt Readings from Last 1 Encounters:   07/02/21 70 kg (154 lb 5 2 oz)     Additional Vital Signs:   97 5 °F (36 4 °C)  51Abnormal   18  130/67  88  93 %  --  --     07/03/21 02:55:51  97 9 °F (36 6 °C)  59  18  159/80  106  96 %  --  --   07/03/21 0013  --  --  --  --  --  --  None (Room air)  --   07/02/21 23:32:02  97 7 °F (36 5 °C)  53Abnormal   18  133/68  90  94 %  --  --   07/02/21 18:57:10  98 °F (36 7 °C)  58  16  118/65  83  95 %  --  --   07/02/21 1530  --  --  --  --  --  --  None (Room air)  --   07/02/21 15:18:14  97 8 °F (36 6 °C)  50Abnormal   16  139/69  92  96 %  --  --   07/02/21 1353  97 1 °F (36 2 °C)Abnormal   57  18  166/79  --  95 %  None (Room air)  Sitting   07/02/21 1330  --  --  --  --  --  --  None (Room air)  --   07/02/21 1300  --  54Abnormal   --  172/74Abnormal   106  93 %  --  --   07/02/21 1228  --  --  --  --  --  --  None (Room air)  --   07/02/21 1200  --  --  --  --  --  97 %  --  --   07/02/21 1130  97 8 °F (36 6 °C)  58  15  176/84Abnormal   --  97 %  None (Room air)  Sitting         Pertinent Labs/Diagnostic Test Results:   CXR  ( 7/2)   NAD  EKG    No  Acute ischemia      Results from last 7 days   Lab Units 07/02/21  1155   WBC Thousand/uL 7 33   HEMOGLOBIN g/dL 14 2   HEMATOCRIT % 42 0   PLATELETS Thousands/uL 167   NEUTROS ABS Thousands/µL 5 32         Results from last 7 days   Lab Units 07/02/21  1155   SODIUM mmol/L 140   POTASSIUM mmol/L 4 2   CHLORIDE mmol/L 105   CO2 mmol/L 28   ANION GAP mmol/L 7   BUN mg/dL 13   CREATININE mg/dL 0 80   EGFR ml/min/1 73sq m 84   CALCIUM mg/dL 8 7 Results from last 7 days   Lab Units 07/02/21  1155   AST U/L 20   ALT U/L 22   ALK PHOS U/L 67   TOTAL PROTEIN g/dL 6 5   ALBUMIN g/dL 3 4*   TOTAL BILIRUBIN mg/dL 0 40         Results from last 7 days   Lab Units 07/02/21  1155   GLUCOSE RANDOM mg/dL 128               Results from last 7 days   Lab Units 07/02/21  1918 07/02/21  1513 07/02/21  1155   TROPONIN I ng/mL <0 02 <0 02 <0 02               Results from last 7 days   Lab Units 07/02/21  1155   LIPASE u/L 199             ED Treatment:   Medication Administration from 07/02/2021 1122 to 07/02/2021 1406       Date/Time Order Dose Route Action Comments     07/02/2021 1147 aspirin chewable tablet 243 mg 243 mg Oral Given         History reviewed  No pertinent past medical history  Present on Admission:   Type 2 diabetes mellitus without complication ([facility] [address] )   Mixed hyperlipidemia   Essential hypertension      Admitting Diagnosis: Shortness of breath [R06 02]  Chest pain [R07 9]  SOB (shortness of breath) [R06 02]  Hypertension [I10]  Near syncope [R55]  Age/Sex: [de-identified] y o  male  Admission Orders:  Scheduled Medications:  heparin (porcine), 5,000 Units, Subcutaneous, Q8H [facility]      Continuous IV Infusions:     PRN Meds:  acetaminophen, 650 mg, Oral, Q6H PRN  hydrALAZINE, 5 mg, Intravenous, Q6H PRN  nitroglycerin, 0 4 mg, Sublingual, Q5 Min PRN  ondansetron, 4 mg, Intravenous, Q6H PRN        IP CONSULT TO CARDIOLOGY     48  Tele     Network Utilization Review Department  ATTENTION: Please call with any questions or concerns to [phone] and carefully listen to the prompts so that you are directed to the right person  All voicemails are confidential   Adal Epp all requests for admission clinical reviews, approved or denied determinations and any other requests to dedicated fax number below belonging to the campus where the patient is receiving treatment   List of dedicated fax numbers for the Facilities:  FACILITY NAME UR FAX NUMBER   ADMISSION DENIALS (Administrative/Medical Necessity) [phone]   [address] (Maternity/NICU/Pediatrics) [address] [facility],[address] Floor [facility] [address] [place] [address]  [phone]   Mary Fontana 0763 [id number] [place] [address] Cynthia Mclean 14[address] [facility] [address] [phone]

## 2021-07-04 ENCOUNTER — APPOINTMENT (INPATIENT)
Dept: NON INVASIVE DIAGNOSTICS | Facility: HOSPITAL | Age: 81
DRG: 198 | End: 2021-07-04
Payer: MEDICAID

## 2021-07-04 VITALS
OXYGEN SATURATION: 94 % | HEART RATE: 68 BPM | RESPIRATION RATE: 19 BRPM | WEIGHT: 154.32 LBS | HEIGHT: 70 IN | TEMPERATURE: 97.5 F | SYSTOLIC BLOOD PRESSURE: 123 MMHG | BODY MASS INDEX: 22.09 KG/M2 | DIASTOLIC BLOOD PRESSURE: 67 MMHG

## 2021-07-04 LAB
ANION GAP SERPL CALCULATED.3IONS-SCNC: 6 MMOL/L (ref 4–13)
ARRHY DURING EX: NORMAL
BASOPHILS # BLD AUTO: 0.04 THOUSANDS/ΜL (ref 0–0.1)
BASOPHILS NFR BLD AUTO: 1 % (ref 0–1)
BUN SERPL-MCNC: 16 MG/DL (ref 5–25)
CALCIUM SERPL-MCNC: 8.9 MG/DL (ref 8.3–10.1)
CHEST PAIN STATEMENT: NORMAL
CHLORIDE SERPL-SCNC: 105 MMOL/L (ref 100–108)
CO2 SERPL-SCNC: 28 MMOL/L (ref 21–32)
CREAT SERPL-MCNC: 0.81 MG/DL (ref 0.6–1.3)
ECG INTERP BEFORE EX: NORMAL
ECG INTERP DURING EX: NORMAL
EOSINOPHIL # BLD AUTO: 0.18 THOUSAND/ΜL (ref 0–0.61)
EOSINOPHIL NFR BLD AUTO: 2 % (ref 0–6)
ERYTHROCYTE [DISTWIDTH] IN BLOOD BY AUTOMATED COUNT: 12.4 % (ref 11.6–15.1)
EST. AVERAGE GLUCOSE BLD GHB EST-MCNC: 123 MG/DL
FUNCTIONAL CAPACITY: NORMAL
GFR SERPL CREATININE-BSD FRML MDRD: 84 ML/MIN/1.73SQ M
GLUCOSE SERPL-MCNC: 108 MG/DL (ref 65–140)
GLUCOSE SERPL-MCNC: 111 MG/DL (ref 65–140)
GLUCOSE SERPL-MCNC: 112 MG/DL (ref 65–140)
HBA1C MFR BLD: 5.9 %
HCT VFR BLD AUTO: 45.4 % (ref 36.5–49.3)
HGB BLD-MCNC: 15.1 G/DL (ref 12–17)
IMM GRANULOCYTES # BLD AUTO: 0.04 THOUSAND/UL (ref 0–0.2)
IMM GRANULOCYTES NFR BLD AUTO: 1 % (ref 0–2)
LYMPHOCYTES # BLD AUTO: 1.84 THOUSANDS/ΜL (ref 0.6–4.47)
LYMPHOCYTES NFR BLD AUTO: 25 % (ref 14–44)
MAX DIASTOLIC BP: 82 MMHG
MAX HEART RATE: 136 BPM
MAX PREDICTED HEART RATE: 140 BPM
MAX. SYSTOLIC BP: 130 MMHG
MCH RBC QN AUTO: 31 PG (ref 26.8–34.3)
MCHC RBC AUTO-ENTMCNC: 33.3 G/DL (ref 31.4–37.4)
MCV RBC AUTO: 93 FL (ref 82–98)
MONOCYTES # BLD AUTO: 0.51 THOUSAND/ΜL (ref 0.17–1.22)
MONOCYTES NFR BLD AUTO: 7 % (ref 4–12)
NEUTROPHILS # BLD AUTO: 4.85 THOUSANDS/ΜL (ref 1.85–7.62)
NEUTS SEG NFR BLD AUTO: 64 % (ref 43–75)
NRBC BLD AUTO-RTO: 0 /100 WBCS
OVERALL BP RESPONSE TO EXERCISE: NORMAL
OVERALL HR RESPONSE TO EXERCISE: NORMAL
PLATELET # BLD AUTO: 156 THOUSANDS/UL (ref 149–390)
PMV BLD AUTO: 10.6 FL (ref 8.9–12.7)
POTASSIUM SERPL-SCNC: 4.2 MMOL/L (ref 3.5–5.3)
PROTOCOL NAME: NORMAL
RBC # BLD AUTO: 4.87 MILLION/UL (ref 3.88–5.62)
SODIUM SERPL-SCNC: 139 MMOL/L (ref 136–145)
TARGET HR FORMULA: NORMAL
TEST INDICATION: NORMAL
TIME IN EXERCISE PHASE: NORMAL
WBC # BLD AUTO: 7.46 THOUSAND/UL (ref 4.31–10.16)

## 2021-07-04 PROCEDURE — 99238 HOSP IP/OBS DSCHRG MGMT 30/<: CPT | Performed by: PHYSICIAN ASSISTANT

## 2021-07-04 PROCEDURE — 80048 BASIC METABOLIC PNL TOTAL CA: CPT | Performed by: PHYSICIAN ASSISTANT

## 2021-07-04 PROCEDURE — 85025 COMPLETE CBC W/AUTO DIFF WBC: CPT | Performed by: PHYSICIAN ASSISTANT

## 2021-07-04 PROCEDURE — 82948 REAGENT STRIP/BLOOD GLUCOSE: CPT

## 2021-07-04 PROCEDURE — 93351 STRESS TTE COMPLETE: CPT | Performed by: INTERNAL MEDICINE

## 2021-07-04 PROCEDURE — 93350 STRESS TTE ONLY: CPT

## 2021-07-04 PROCEDURE — 99232 SBSQ HOSP IP/OBS MODERATE 35: CPT | Performed by: INTERNAL MEDICINE

## 2021-07-04 PROCEDURE — 83036 HEMOGLOBIN GLYCOSYLATED A1C: CPT | Performed by: NURSE PRACTITIONER

## 2021-07-04 RX ADMIN — ASPIRIN 81 MG CHEWABLE TABLET 81 MG: 81 TABLET CHEWABLE at 10:36

## 2021-07-04 RX ADMIN — LOSARTAN POTASSIUM 25 MG: 25 TABLET, FILM COATED ORAL at 10:38

## 2021-07-04 RX ADMIN — HEPARIN SODIUM 5000 UNITS: 5000 INJECTION INTRAVENOUS; SUBCUTANEOUS at 05:48

## 2021-07-04 RX ADMIN — ATORVASTATIN CALCIUM 20 MG: 20 TABLET, FILM COATED ORAL at 10:36

## 2021-07-04 RX ADMIN — B-COMPLEX W/ C & FOLIC ACID TAB 1 TABLET: TAB at 08:31

## 2021-07-04 NOTE — PLAN OF CARE
Problem: PAIN - ADULT  Goal: Verbalizes/displays adequate comfort level or baseline comfort level  Description: Interventions:  - Encourage patient to monitor pain and request assistance  - Assess pain using appropriate pain scale  - Administer analgesics based on type and severity of pain and evaluate response  - Implement non-pharmacological measures as appropriate and evaluate response  - Consider cultural and social influences on pain and pain management  - Notify physician/advanced practitioner if interventions unsuccessful or patient reports new pain  Outcome: Progressing     Problem: Knowledge Deficit  Goal: Patient/family/caregiver demonstrates understanding of disease process, treatment plan, medications, and discharge instructions  Description: Complete learning assessment and assess knowledge base    Interventions:  - Provide teaching at level of understanding  - Provide teaching via preferred learning methods  Outcome: Progressing     Problem: SAFETY ADULT  Goal: Patient will remain free of falls  Description: INTERVENTIONS:  - Educate patient/family on patient safety including physical limitations  - Instruct patient to call for assistance with activity   - Consult OT/PT to assist with strengthening/mobility   - Keep Call bell within reach  - Keep bed low and locked with side rails adjusted as appropriate  - Keep care items and personal belongings within reach  - Initiate and maintain comfort rounds  - Make Fall Risk Sign visible to staff  - Offer Toileting every 2 Hours, in advance of need  - Initiate/Maintain alarm  - Obtain necessary fall risk management equipment:   - Apply yellow socks and bracelet for high fall risk patients  - Consider moving patient to room near nurses station  Outcome: Progressing

## 2021-07-04 NOTE — ASSESSMENT & PLAN NOTE
No results found for: HGBA1C    Recent Labs     07/04/21  0622 07/04/21  1037   POCGLU 108 111       Blood Sugar Average: Last 72 hrs:  Resume home regimen

## 2021-07-04 NOTE — ASSESSMENT & PLAN NOTE
Presented with exertional chest pain  Has had intermittent episodes recently  Heart score 7  EKG showed no signs of acute ischemia  Troponin negative x 3  Given typical symptoms Cardiology consulted- the patient underwent a stress test which was negative  Patient was noted to have some bradycardia, the patient's Toprol XL was held and patient/son instructed not to resume on discharge per cardiology recommendations  Patient has an outpatient appointment with his cardiologist on Tuesday 7/6/2021

## 2021-07-04 NOTE — DISCHARGE SUMMARY
[address]  Discharge- [facility] 1940, [de-identified] y o  male MRN: [id number]  Unit/Bed#: -Amna Encounter: [id number]  Primary Care Provider: No primary care provider on file  Date and time admitted to hospital: 7/2/2021 11:28 AM    * Chest pain  Assessment & Plan  Presented with exertional chest pain  Has had intermittent episodes recently  Heart score 7  EKG showed no signs of acute ischemia  Troponin negative x 3  Given typical symptoms Cardiology consulted- the patient underwent a stress test which was negative  Patient was noted to have some bradycardia, the patient's Toprol XL was held and patient/son instructed not to resume on discharge per cardiology recommendations  Patient has an outpatient appointment with his cardiologist on Tuesday 7/6/2021  Mixed hyperlipidemia  Assessment & Plan  Continue statin therapy    Type 2 diabetes mellitus without complication [facility])  Assessment & Plan  No results found for: HGBA1C    Recent Labs     07/04/21  0622 07/04/21  1037   POCGLU 108 111       Blood Sugar Average: Last 72 hrs:  Resume home regimen  S/P coronary artery stent placement  Assessment & Plan  Continue aspirin, statin  Hold Toprol XL, follow-up with Cardiology      Essential hypertension  Assessment & Plan  Continue losartan  Toprol XL held in the setting of bradycardia- patient instructed NOT to resume on discharge  Outpatient follow-up with his cardiologist- patient scheduled for Tuesday 7/6/2021       Medical Problems     Resolved Problems  Date Reviewed: 7/4/2021    None              Discharging Physician / Practitioner: Jorge Mistry PA-C  PCP: No primary care provider on file    Admission Date:   Admission Orders (From admission, onward)     Ordered        07/03/21 1339  Inpatient Admission  Once         07/02/21 1313  Place in Observation  Once                   Discharge Date: 07/04/21    Consultations During [facility] Stay:  · Cardiology    Procedures Performed:   · none    Significant Findings / Test Results:   XR chest 1 view portable    Result Date: 7/2/2021  · Impression: No acute cardiopulmonary disease  Workstation performed: [id number]   ·     Incidental Findings:   · none     Test Results Pending at Discharge (will require follow up):   · none     Outpatient Tests Requested:  · none    Complications:  none    Reason for Admission: chest pain    Hospital Course:   Valdo Cardenas is a [de-identified] y o  male patient who originally presented to the hospital on 7/2/2021 due to chest pain  The patient has a past medical history significant type 2 diabetes, coronary artery disease status post PCI 2010, hyperlipidemia, hypertension initially presented chest pain  Reports exertional chest pain worse with walking on day of admission  Also notes shortness of breath  Notes intermittent symptoms the past few weeks with worsening acutely on day of admission  Otherwise denies any acute complaints      Please see above list of diagnoses and related plan for additional information  Condition at Discharge: good    Discharge Day Visit / Exam:   Subjective:    Vitals: Blood Pressure: 123/67 (07/04/21 1038)  Pulse: 68 (07/04/21 1038)  Temperature: 97 5 °F (36 4 °C) (07/04/21 0705)  Temp Source: Oral (07/03/21 1606)  Respirations: 19 (07/04/21 0705)  Height: 5' 10 47" (179 cm) (07/02/21 1416)  Weight - Scale: 70 kg (154 lb 5 2 oz) (07/02/21 1130)  SpO2: 94 % (07/04/21 1038)  Exam:   Physical Exam  Constitutional:       Appearance: Normal appearance  HENT:      Head: Normocephalic and atraumatic  Cardiovascular:      Rate and Rhythm: Normal rate and regular rhythm  Pulmonary:      Effort: Pulmonary effort is normal       Breath sounds: Normal breath sounds  Skin:     General: Skin is warm and dry  Neurological:      General: No focal deficit present  Mental Status: He is alert and oriented to person, place, and time  Discussion with Family: Updated  (son) at bedside  Discharge instructions/Information to patient and family:   See after visit summary for information provided to patient and family  Provisions for Follow-Up Care:  See after visit summary for information related to follow-up care and any pertinent home health orders  Disposition:   Home    Planned Readmission: no     Discharge Statement:  I spent 25 minutes discharging the patient  This time was spent on the day of discharge  I had direct contact with the patient on the day of discharge  Greater than 50% of the total time was spent examining patient, answering all patient questions, arranging and discussing plan of care with patient as well as directly providing post-discharge instructions  Additional time then spent on discharge activities  Discharge Medications:  See after visit summary for reconciled discharge medications provided to patient and/or family        **Please Note: This note may have been constructed using a voice recognition system**

## 2021-07-04 NOTE — ASSESSMENT & PLAN NOTE
Continue losartan  Toprol XL held in the setting of bradycardia- patient instructed NOT to resume on discharge  Outpatient follow-up with his cardiologist- patient scheduled for Tuesday 7/6/2021

## 2021-07-04 NOTE — PLAN OF CARE
Problem: Potential for Falls  Goal: Patient will remain free of falls  Description: INTERVENTIONS:  - Educate patient/family on patient safety including physical limitations  - Instruct patient to call for assistance with activity   - Consult OT/PT to assist with strengthening/mobility   - Keep Call bell within reach  - Keep bed low and locked with side rails adjusted as appropriate  - Keep care items and personal belongings within reach  - Initiate and maintain comfort rounds  - Make Fall Risk Sign visible to staff  - Offer Toileting every 2 Hours, in advance of need  - Initiate/Maintain as needed alarm  - Obtain necessary fall risk management equipment: as needed it  - Apply yellow socks and bracelet for high fall risk patients  - Consider moving patient to room near nurses station  Outcome: Progressing

## 2021-07-04 NOTE — PROGRESS NOTES
General Cardiology   Progress Note   Myra Dunbar [de-identified] y o  male MRN: [id number]  Unit/Bed#: -01 Encounter: [id number]      SUBJECTIVE:   No significant events overnight  Patient denies any chest pain or shortness of breath  Beta-blockers discontinued secondary to bradycardia  REVIEW OF SYSTEMS:  Constitutional:  Denies fever or chills   Eyes:  Denies change in visual acuity   HENT:  Denies nasal congestion or sore throat   Respiratory:  Denies cough or shortness of breath   Cardiovascular:  Denies chest pain or edema   GI:  Denies abdominal pain, nausea, vomiting, bloody stools or diarrhea   :  Denies dysuria, frequency, difficulty in micturition and nocturia  Musculoskeletal:  Denies back pain or joint pain   Neurologic:  Denies headache, focal weakness or sensory changes   Endocrine:  Denies polyuria or polydipsia   Lymphatic:  Denies swollen glands   Psychiatric:  Denies depression or anxiety     OBJECTIVE:   Vitals:  Vitals:    21 0705   BP: 134/65   Pulse: 57   Resp: 19   Temp: 97 5 °F (36 4 °C)   SpO2: 95%     Body mass index is 21 85 kg/m²  Systolic (34ETG), XIK:086 , Min:124 , OAJ:504     Diastolic (44LIN), WX, Min:64, Max:70      Intake/Output Summary (Last 24 hours) at 2021 0957  Last data filed at 2021 0330  Gross per 24 hour   Intake 720 ml   Output 275 ml   Net 445 ml     Weight (last 2 days)       Date/Time   Weight    21 1130   70 (154 32)                    PHYSICAL EXAMS:  General:  Patient is not in acute distress, laying in the bed comfortably, awake, alert responding to commands  Head: Normocephalic, Atraumatic  HEENT:  Both pupils normal-size atraumatic, normocephalic, nonicteric  Neck:  JVP not raised  Trachea central  Respiratory:  Bronchovascular breathing all over the chest without any accompaniment  Cardiovascular:   Regular rate and rhythm no S3 no murmurs  GI:  Abdomen soft nontender   Liver and spleen normal size  Lymphatic:  No cervical or inguinal lymphadenopathy  Neurologic:  Patient is awake alert, responding to command, well-oriented to time and place and person moving     LABORATORY RESULTS:  Results from last 7 days   Lab Units 21  1918 21  1513 21  1155   TROPONIN I ng/mL <0 02 <0 02 <0 02       CBC with diff:   Results from last 7 days   Lab Units 21  0507 21  1155   WBC Thousand/uL 7 46 7 33   HEMOGLOBIN g/dL 15 1 14 2   HEMATOCRIT % 45 4 42 0   MCV fL 93 93   PLATELETS Thousands/uL 156 167   MCH pg 31 0 31 5   MCHC g/dL 33 3 33 8   RDW % 12 4 12 4   MPV fL 10 6 10 9   NRBC AUTO /100 WBCs 0 0       CMP:  Results from last 7 days   Lab Units 21  0504 21  1155   POTASSIUM mmol/L 4 2 4 2   CHLORIDE mmol/L 105 105   CO2 mmol/L 28 28   BUN mg/dL 16 13   CREATININE mg/dL 0 81 0 80   CALCIUM mg/dL 8 9 8 7   AST U/L  --  20   ALT U/L  --  22   ALK PHOS U/L  --  67   EGFR ml/min/1 73sq m 84 84       BMP:  Results from last 7 days   Lab Units 21  0504 21  1155   POTASSIUM mmol/L 4 2 4 2   CHLORIDE mmol/L 105 105   CO2 mmol/L 28 28   BUN mg/dL 16 13   CREATININE mg/dL 0 81 0 80   CALCIUM mg/dL 8 9 8 7                              Lipid Profile:   No results found for: CHOL  No results found for: HDL  No results found for: LDLCALC  No results found for: TRIG    Cardiac testing:  Results for orders placed during the hospital encounter of 21    Echo complete with contrast if indicated    Narrative  [address] [place] [address] [place] [address]  [phone]    Transthoracic Echocardiogram  2D, M-mode, Doppler, and Color Doppler    Study date:  2021    Patient: Dawna Cockayne  MR number: [id number]  Account number: [de-identified]  : 1940  Age: [de-identified] years  Gender: Male  Status: Outpatient  Location: Bedside  Height: 65 in  Weight: 154 lb  BP: 176/ 84 mmHg    Indications: Assess left ventricular function      Diagnoses: I25 10 - Atherosclerotic heart disease of native coronary 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 angina pectoris    Sonographer:  Rabia Montoya RDCS  Referring Physician:  Shi Mckenna MD  Group:  [facility]  Interpreting Physician:  Paula Newsome MD    SUMMARY    LEFT VENTRICLE:  Ejection fraction was estimated to be 60 %  There were no regional wall motion abnormalities  Concentric hypertrophy was present  Doppler parameters were consistent with abnormal left ventricular relaxation (grade 1 diastolic dysfunction)  MITRAL VALVE:  There was mild annular calcification  There was trace regurgitation  TRICUSPID VALVE:  There was mild regurgitation  PULMONIC VALVE:  There was trace regurgitation  HISTORY: PRIOR HISTORY: hypertension, CP, DM, hyperlipidemia, SOB    PROCEDURE: The procedure was performed at the bedside  This was a routine study  The transthoracic approach was used  The study included complete 2D imaging, M-mode, complete spectral Doppler, and color Doppler  The heart rate was 49 bpm,  at the start of the study  Images were obtained from the parasternal, apical, subcostal, and suprasternal notch acoustic windows  Image quality was adequate  LEFT VENTRICLE: Size was normal  Ejection fraction was estimated to be 60 %  There were no regional wall motion abnormalities  Concentric hypertrophy was present  DOPPLER: Doppler parameters were consistent with abnormal left ventricular  relaxation (grade 1 diastolic dysfunction)  RIGHT VENTRICLE: The size was normal  Systolic function was normal  Wall thickness was normal     LEFT ATRIUM: Size was normal     RIGHT ATRIUM: Size was normal     MITRAL VALVE: There was mild annular calcification  Valve structure was normal  There was normal leaflet separation  DOPPLER: The transmitral velocity was within the normal range  There was no evidence for stenosis  There was trace  regurgitation  AORTIC VALVE: The valve was trileaflet  Leaflets exhibited mild calcification  DOPPLER: There was no evidence for stenosis  TRICUSPID VALVE: The valve structure was normal  There was normal leaflet separation  DOPPLER: The transtricuspid velocity was within the normal range  There was no evidence for stenosis  There was mild regurgitation  PULMONIC VALVE: Leaflets exhibited normal thickness, no calcification, and normal cuspal separation  DOPPLER: The transpulmonic velocity was within the normal range  There was trace regurgitation  PERICARDIUM: There was no pericardial effusion  The pericardium was normal in appearance  AORTA: The root exhibited normal size  SYSTEM MEASUREMENT TABLES    2D  %FS: 27 7 %  Ao Diam: 3 2 cm  EDV(Teich): 94 ml  EF(Teich): 53 8 %  ESV(Teich): 43 5 ml  IVSd: 1 cm  LA Area: 20 cm2  LA Diam: 3 6 cm  LVEDV MOD A4C: 119 ml  LVEF MOD A4C: 58 4 %  LVESV MOD A4C: 49 5 ml  LVIDd: 4 5 cm  LVIDs: 3 3 cm  LVLd A4C: 8 6 cm  LVLs A4C: 7 2 cm  LVPWd: 1 1 cm  RA Area: 14 5 cm2  RVIDd: 3 6 cm  SV MOD A4C: 69 5 ml  SV(Teich): 50 6 ml    CW  TR Vmax: 2 5 m/s  TR maxP 9 mmHg    MM  TAPSE: 2 1 cm    PW  E' Sept: 0 1 m/s  E/E' Sept: 9  MV A Giacomo: 0 8 m/s  MV Dec Runnels: 3 6 m/s2  MV DecT: 194 3 ms  MV E Giacomo: 0 7 m/s  MV E/A Ratio: 0 8  MV PHT: 56 4 ms  MVA By PHT: 3 9 cm2    [facility] [place] [facility] Accredited Ech[facility]    Prepared and electronically signed by    Alfonzo Centeno MD  Signed 2021 16:49:57    No results found for this or any previous visit  No results found for this or any previous visit  No valid procedures specified  No results found for this or any previous visit        Meds/Allergies   all current active meds have been reviewed  Medications Prior to Admission   Medication    aspirin 81 mg chewable tablet    atorvastatin (LIPITOR) 20 mg tablet    B Complex-C-Folic Acid (B complex-vitamin C-folic acid) 1 MG tablet    losartan (COZAAR) 25 mg tablet    magnesium 30 MG tablet    metFORMIN (GLUCOPHAGE) 500 mg tablet ASSESSMENT & PLAN   Principal Problem:    Chest pain  Active Problems:    Essential hypertension    S/P coronary artery stent placement    Type 2 diabetes mellitus without complication (HCC)    Mixed hyperlipidemia        Patient with symptoms of chest pain with known history of coronary disease status post PCI approximately 10 years ago  Patient had a stress echocardiogram which was negative for ischemia  Patient has good functional aerobic capacity  Ejection fraction was normal   Beta-blockers discontinued secondary to bradycardia  Patient does have a follow-up appointment with his cardiologist next week  Patient understands the sensitivity and specificity of stress test   Symptoms to watch out from cardiac standpoint which would indicate the need for further cardiac evaluation including consideration for cardiac catheterization discussed  Patient is agreeable with the plan of care  Also discussed with Lisa  [facility]  Sushil Concepcion MD  7/4/2021,9:57 AM    Portions of the record may have been created with voice recognition software  Occasional wrong word or "sound a like" substitutions may have occurred due to the inherent limitations of voice recognition software  Read the chart carefully and recognize, using context, where substitutions have occurred

## 2021-07-06 NOTE — UTILIZATION REVIEW
Inpatient Admission Authorization Request   NOTIFICATION OF INPATIENT ADMISSION/INPATIENT AUTHORIZATION REQUEST   SERVICING FACILITY:   [address] [place] [address]  Tax ID: [id number]  NPI: [id number]  Place of Service: Inpatient [address] [facility]  [address]  Place of Service Code: 24     ATTENDING PROVIDER:  Attending Name and NPI#: Tristin Mcmanus Md [[id number]]  Address: [address] [place] [address]  Phone: [phone]     UTILIZATION REVIEW CONTACT:  [facility] Utilization   Network Utilization Review Department  Phone: [phone]  Fax [phone]  Email: [email or web address]     PHYSICIAN ADVISORY SERVICES:  FOR [id number]-[id number] REVIEW - MEDICAL NECESSITY DENIAL  Phone: [phone]  Fax: [phone]  Email: [email or web address]     TYPE OF REQUEST:  Inpatient Status     ADMISSION INFORMATION:  ADMISSION DATE/TIME: 7/3/21  1:39 PM  PATIENT DIAGNOSIS CODE/DESCRIPTION:  Shortness of breath [R06 02]  Chest pain [R07 9]  SOB (shortness of breath) [R06 02]  Hypertension [I10]  Near syncope [R55]  DISCHARGE DATE/TIME: 7/4/2021 11:39 AM  DISCHARGE DISPOSITION (IF DISCHARGED): Home/Self Care     IMPORTANT INFORMATION:  Please contact the [facility]oney directly with any questions or concerns regarding this request  Department voicemails are confidential     Send requests for admission clinical reviews, concurrent reviews, approvals, and administrative denials due to lack of clinical to fax [phone]